# Patient Record
Sex: FEMALE | Race: WHITE | Employment: PART TIME | URBAN - NONMETROPOLITAN AREA
[De-identification: names, ages, dates, MRNs, and addresses within clinical notes are randomized per-mention and may not be internally consistent; named-entity substitution may affect disease eponyms.]

---

## 2022-01-06 ENCOUNTER — HOSPITAL ENCOUNTER (EMERGENCY)
Age: 47
Discharge: HOME | End: 2022-01-06
Payer: COMMERCIAL

## 2022-01-06 VITALS
BODY MASS INDEX: 26.54 KG/M2 | DIASTOLIC BLOOD PRESSURE: 88 MMHG | TEMPERATURE: 100.5 F | RESPIRATION RATE: 18 BRPM | OXYGEN SATURATION: 97 % | SYSTOLIC BLOOD PRESSURE: 134 MMHG | HEART RATE: 84 BPM | WEIGHT: 185 LBS

## 2022-01-06 DIAGNOSIS — J06.9 VIRAL URI WITH COUGH: Primary | ICD-10-CM

## 2022-01-06 LAB
FLU A ANTIGEN: NEGATIVE
FLU B ANTIGEN: NEGATIVE
SARS-COV-2, NAA: NOT  DETECTED

## 2022-01-06 PROCEDURE — 99203 OFFICE O/P NEW LOW 30 MIN: CPT

## 2022-01-06 PROCEDURE — 87804 INFLUENZA ASSAY W/OPTIC: CPT

## 2022-01-06 PROCEDURE — 87635 SARS-COV-2 COVID-19 AMP PRB: CPT

## 2022-01-06 PROCEDURE — 99213 OFFICE O/P EST LOW 20 MIN: CPT | Performed by: EMERGENCY MEDICINE

## 2022-01-06 RX ORDER — DEXTROMETHORPHAN HYDROBROMIDE AND PROMETHAZINE HYDROCHLORIDE 15; 6.25 MG/5ML; MG/5ML
5 SYRUP ORAL 4 TIMES DAILY PRN
Qty: 118 ML | Refills: 0 | Status: SHIPPED | OUTPATIENT
Start: 2022-01-06 | End: 2022-01-13

## 2022-01-06 ASSESSMENT — ENCOUNTER SYMPTOMS
SORE THROAT: 1
COUGH: 1
NAUSEA: 0
VOMITING: 0
SHORTNESS OF BREATH: 0
VOICE CHANGE: 1
ABDOMINAL PAIN: 0
RHINORRHEA: 1
DIARRHEA: 0

## 2022-01-06 ASSESSMENT — PAIN DESCRIPTION - PAIN TYPE: TYPE: ACUTE PAIN

## 2022-01-06 ASSESSMENT — PAIN DESCRIPTION - DESCRIPTORS: DESCRIPTORS: ACHING

## 2022-01-06 NOTE — Clinical Note
Ximena Sanchez was seen and treated in our emergency department on 1/6/2022. She may return to work on 01/08/2022. If you have any questions or concerns, please don't hesitate to call.       Jazmine Barr, GERTRUDIS - CNP

## 2022-01-06 NOTE — ED PROVIDER NOTES
[facility]  Urgent Care Encounter       CHIEF COMPLAINT       Chief Complaint   Patient presents with    Cough     Cough, runny nose, sore throat, body aches, headache. Started . Pt was tested Monday and was negative. Nurses Notes reviewed and I agree except as noted in the HPI. HISTORY OF PRESENT ILLNESS   Sowmya Gamino is a 42 y.o. female who presents for complaints of cough, runny nose, sore throat body aches, headache, chills. Fever. Patient states that she took a rapid Covid test 2 days ago and this was negative. This is day 4 of her symptoms. Patient continues to have fever. States she has extreme fatigue. Patient denies chest pain or significant shortness of breath. No nausea, vomiting, diarrhea. HPI    REVIEW OF SYSTEMS     Review of Systems   Constitutional: Positive for fatigue and fever. HENT: Positive for congestion, rhinorrhea, sore throat and voice change (hoarseness). Respiratory: Positive for cough. Negative for shortness of breath. Cardiovascular: Negative for chest pain. Gastrointestinal: Negative for abdominal pain, diarrhea, nausea and vomiting. Genitourinary: Negative for dysuria, frequency and urgency. Neurological: Positive for headaches. Negative for dizziness. Psychiatric/Behavioral: Negative for behavioral problems. PAST MEDICAL HISTORY         Diagnosis Date    Anxiety     GERD (gastroesophageal reflux disease)     Nicotine dependence     Seizures ([address]        SURGICALHISTORY     Patient  has a past surgical history that includes  section and Tubal ligation.     CURRENT MEDICATIONS       Discharge Medication List as of 2022  4:10 PM      CONTINUE these medications which have NOT CHANGED    Details   ibuprofen (ADVIL;MOTRIN) 600 MG tablet Take 1 tablet by mouth every 8 hours as needed for Pain (take with food), Disp-30 tablet, R-0             ALLERGIES     Patient is is allergic to codeine and penicillins. Patients   Immunization History   Administered Date(s) Administered    Tdap (Boostrix, Adacel) 11/20/2014       FAMILY HISTORY     Patient's family history includes Asthma in her father; COPD in her father; Emphysema in her father; Other in her mother. SOCIAL HISTORY     Patient  reports that she has been smoking cigarettes. She has a 16.00 pack-year smoking history. She has never used smokeless tobacco. She reports current alcohol use. She reports that she does not use drugs. PHYSICAL EXAM     ED TRIAGE VITALS  BP: 134/88, Temp: 100.5 °F (38.1 °C), Pulse: 84, Resp: 18, SpO2: 97 %,Estimated body mass index is 26.54 kg/m² as calculated from the following:    Height as of 5/10/16: 5' 10\" (1.778 m). Weight as of this encounter: 185 lb (83.9 kg). ,No LMP recorded (approximate). Patient has had a hysterectomy. Physical Exam  Constitutional:       Appearance: She is ill-appearing. HENT:      Head: Normocephalic. Nose: Congestion and rhinorrhea present. Mouth/Throat:      Mouth: Mucous membranes are moist.      Pharynx: Oropharynx is clear. No oropharyngeal exudate or posterior oropharyngeal erythema. Cardiovascular:      Rate and Rhythm: Normal rate and regular rhythm. Pulses: Normal pulses. Heart sounds: Normal heart sounds. Pulmonary:      Effort: Pulmonary effort is normal. No respiratory distress. Breath sounds: Normal breath sounds. No wheezing or rhonchi. Musculoskeletal:      Cervical back: Normal range of motion. Skin:     General: Skin is warm and dry. Capillary Refill: Capillary refill takes less than 2 seconds. Neurological:      General: No focal deficit present. Mental Status: She is alert.    Psychiatric:         Mood and Affect: Mood normal.         Behavior: Behavior normal.         DIAGNOSTIC RESULTS     Labs:  Results for orders placed or performed during the hospital encounter of 01/06/22   COVID-19, Rapid   Result Value Ref Range SARS-CoV-2, ROBERTO NOT  DETECTED NOT DETECTED   Rapid influenza A/B antigens   Result Value Ref Range    Flu A Antigen Negative NEGATIVE    Flu B Antigen Negative NEGATIVE       IMAGING:    No orders to display         EKG:      URGENT CARE COURSE:     Vitals:    01/06/22 1536   BP: 134/88   Pulse: 84   Resp: 18   Temp: 100.5 °F (38.1 °C)   TempSrc: Temporal   SpO2: 97%   Weight: 185 lb (83.9 kg)       Medications - No data to display         PROCEDURES:  None    FINAL IMPRESSION      1. Viral URI with cough          DISPOSITION/ PLAN     Patient presents for his likely viral upper respiratory infection with cough. Patient's Covid and influenza testing are negative. Her lungs are clear. Pulse ox 97%. Patient will be discharged with prescription for Promethazine DM for treatment of cough. Advised to stop smoking. Drink plenty of water. Off work today and tomorrow return January 8. Return for worsening cough, chest pain, shortness of breath, actual temperature 100.5 or greater, new concerns. PATIENT REFERRED TO:  No primary care provider on file. No primary physician on file.       DISCHARGE MEDICATIONS:  Discharge Medication List as of 1/6/2022  4:10 PM      START taking these medications    Details   promethazine-dextromethorphan (PROMETHAZINE-DM) 6.25-15 MG/5ML syrup Take 5 mLs by mouth 4 times daily as needed for Cough, Disp-118 mL, R-0Normal             Discharge Medication List as of 1/6/2022  4:10 PM          Discharge Medication List as of 1/6/2022  4:10 PM          GERTRUDIS Rendon CNP    (Please note that portions of this note were completed with a voice recognition program. Efforts were made to edit the dictations but occasionally words are mis-transcribed.)          GERTRUDIS Rendon CNP  01/06/22 3267

## 2022-01-06 NOTE — ED TRIAGE NOTES
Pt walked to room 7. Pt here with complaints of a cough, sore throat runny nose, body aches. Started Dhruv night. Was tested for covid Monday and was negative. Pt went to work today and said she couldn't do it. She was told to get checked.